# Patient Record
Sex: FEMALE | Race: WHITE | NOT HISPANIC OR LATINO | Employment: OTHER | URBAN - NONMETROPOLITAN AREA
[De-identification: names, ages, dates, MRNs, and addresses within clinical notes are randomized per-mention and may not be internally consistent; named-entity substitution may affect disease eponyms.]

---

## 2017-08-18 ENCOUNTER — OFFICE VISIT (OUTPATIENT)
Dept: CARDIOLOGY | Facility: PHYSICIAN GROUP | Age: 67
End: 2017-08-18
Payer: MEDICARE

## 2017-08-18 VITALS
HEART RATE: 70 BPM | OXYGEN SATURATION: 94 % | SYSTOLIC BLOOD PRESSURE: 110 MMHG | HEIGHT: 61 IN | DIASTOLIC BLOOD PRESSURE: 70 MMHG | BODY MASS INDEX: 32.85 KG/M2 | WEIGHT: 174 LBS

## 2017-08-18 DIAGNOSIS — I34.1 MVP (MITRAL VALVE PROLAPSE): ICD-10-CM

## 2017-08-18 DIAGNOSIS — I35.1 NONRHEUMATIC AORTIC VALVE INSUFFICIENCY: ICD-10-CM

## 2017-08-18 PROCEDURE — 99214 OFFICE O/P EST MOD 30 MIN: CPT | Performed by: INTERNAL MEDICINE

## 2017-08-18 ASSESSMENT — ENCOUNTER SYMPTOMS
HEARTBURN: 0
NERVOUS/ANXIOUS: 0
INSOMNIA: 0
COUGH: 0
MYALGIAS: 0
NAUSEA: 0
WEIGHT LOSS: 0
DIZZINESS: 0
PALPITATIONS: 0
HEADACHES: 0
NECK PAIN: 0
SHORTNESS OF BREATH: 0
BLOOD IN STOOL: 0
LOSS OF CONSCIOUSNESS: 0

## 2017-08-18 NOTE — PROGRESS NOTES
Subjective:   Shayy Flanagan is a 66 y.o. female who presents today in follow-up in regards to her mitral and aortic valve disease in the setting of palpitations    No more hospitalizations, she had a life-threatening bleed in 2015 resulting in surgery. She is tired a lot and wonders if this is due to malnutrition or lack of absorption     No chest pain or breathlessness   Past Medical History   Diagnosis Date   • Hypertension    • Palpitations    • Anxiety    • Insomnia    • Claustrophobia    • Mitral valve prolapse August 2014     Echocardiogram with normal LV size, mild concentric LVH, LVEF 60-65%. Mild-moderate AR, mild MR, no MVP seen. Trace TR. RVSP 37mmHg.   • Heart burn    • Indigestion    • Optic nerve drusen      Past Surgical History   Procedure Laterality Date   • Knee replacement, total  November 2013     Left knee   • Cholecystectomy  1995   • Tonsillectomy  1967   • Gastroscopy with biopsy  10/27/2014     Performed by Eric Kemp M.D. at SURGERY [facility]   • Gastric bypass laparoscopic revision  10/28/2014     Performed by John H Ganser, M.D. at [facility]     Family History   Problem Relation Age of Onset   • Stroke Brother      History   Smoking status   • Never Smoker    Smokeless tobacco   • Never Used     Allergies   Allergen Reactions   • Iodine    • Levaquin      Outpatient Encounter Prescriptions as of 8/18/2017   Medication Sig Dispense Refill   • levothyroxine (SYNTHROID) 75 MCG Tab Take 75 mcg by mouth Every morning on an empty stomach.     • gabapentin (NEURONTIN) 300 MG Cap Take 300 mg by mouth 3 times a day.     • sumatriptan (IMITREX) 100 MG tablet Take 100 mg by mouth Once PRN for Migraine.     • metoprolol SR (TOPROL XL) 25 MG TABLET SR 24 HR Take 1 Tab by mouth every day. 30 Tab 11   • hydrocodone/acetaminophen (NORCO)  MG TABS Take 1-2 Tabs by mouth every 6 hours as needed. 20 Tab 0   • lisinopril (PRINIVIL) 20 MG TABS Take 20 mg by mouth  "every day.       • latanoprost (XALATAN) 0.005 % SOLN Place 1 Drop in both eyes every evening.       • Multiple Vitamins-Minerals (MULTIVITAMIN PO) Take  by mouth every day.       • fluticasone (FLONASE) 50 MCG/ACT nasal spray Spray 2 Sprays in nose every day. 1 Bottle 1   • albuterol 108 (90 BASE) MCG/ACT Aero Soln inhalation aerosol Inhale 2 Puffs by mouth every 6 hours as needed for Shortness of Breath. 8.5 g 1     No facility-administered encounter medications on file as of 8/18/2017.     Review of Systems   Constitutional: Positive for malaise/fatigue. Negative for weight loss.   Respiratory: Negative for cough and shortness of breath.    Cardiovascular: Negative for chest pain, palpitations and leg swelling.   Gastrointestinal: Negative for heartburn, nausea and blood in stool.   Genitourinary: Negative for dysuria.   Musculoskeletal: Negative for myalgias and neck pain.   Skin: Negative for rash.   Neurological: Negative for dizziness, loss of consciousness and headaches.   Psychiatric/Behavioral: The patient is not nervous/anxious and does not have insomnia.    All other systems reviewed and are negative.       Objective:   /70 mmHg  Pulse 70  Ht 1.549 m (5' 0.98\")  Wt 78.926 kg (174 lb)  BMI 32.89 kg/m2  SpO2 94%    Physical Exam   Constitutional: She is oriented to person, place, and time. She appears well-developed.   HENT:   Mouth/Throat: Mucous membranes are normal.   Eyes: Conjunctivae and EOM are normal.   Neck: Normal range of motion. Neck supple. No JVD present. No thyroid mass and no thyromegaly present.   Cardiovascular: Normal rate, regular rhythm and intact distal pulses.    Murmur (26 blowing holosystolic at the apex) heard.  Normal Valsalva   Pulmonary/Chest: Effort normal and breath sounds normal. No respiratory distress. She exhibits no tenderness.   Abdominal: Bowel sounds are normal. She exhibits no distension.   Musculoskeletal: Normal range of motion. She exhibits no edema. "   Neurological: She is alert and oriented to person, place, and time. She has normal strength. She displays no tremor. Coordination normal.   Skin: Skin is warm and dry. No rash noted.   Psychiatric: She has a normal mood and affect. Her behavior is normal.   Vitals reviewed.      Assessment:     1. MVP (mitral valve prolapse)  Echocardiogram Comp w/o Cont   2. Nonrheumatic aortic valve insufficiency         Medical Decision Making:  Today's Assessment / Status / Plan:     Valvular heart disease. We reviewed her echo from 2016, she has mild to moderate aortic and mitral regurgitation. She had no significant mitral valve prolapse. Repeat this year     fatigue, as above. I also suggested she follow-up her thyroid-stimulating hormone as well as a B12 and complete blood count with her primary. We also talked about holding the metoprolol or taking it every other day if she is still tired     Palpitations, resolved on low-dose metoprolol  Hypertension, much better control and current medications, in fact it may be too low. We discussed this     RTC based on above   \We will repeat her echocardiogram

## 2017-08-18 NOTE — Clinical Note
[facility] [address]  Phone: [phone]  Fax: [phone]              Shayy Flanagan  1950    Encounter Date: 8/18/2017    Dang Ibarra M.D.          PROGRESS NOTE:  Subjective:   Shayy Flanagan is a 66 y.o. female who presents today in follow-up in regards to her mitral and aortic valve disease in the setting of palpitations    No more hospitalizations, she had a life-threatening bleed in 2015 resulting in surgery. She is tired a lot and wonders if this is due to malnutrition or lack of absorption     No chest pain or breathlessness   Past Medical History   Diagnosis Date   • Hypertension    • Palpitations    • Anxiety    • Insomnia    • Claustrophobia    • Mitral valve prolapse August 2014     Echocardiogram with normal LV size, mild concentric LVH, LVEF 60-65%. Mild-moderate AR, mild MR, no MVP seen. Trace TR. RVSP 37mmHg.   • Heart burn    • Indigestion    • Optic nerve drusen      Past Surgical History   Procedure Laterality Date   • Knee replacement, total  November 2013     Left knee   • Cholecystectomy  1995   • Tonsillectomy  1967   • Gastroscopy with biopsy  10/27/2014     Performed by Eric Kemp M.D. at SURGERY [facility]   • Gastric bypass laparoscopic revision  10/28/2014     Performed by John H Ganser, M.D. at [facility]     Family History   Problem Relation Age of Onset   • Stroke Brother      History   Smoking status   • Never Smoker    Smokeless tobacco   • Never Used     Allergies   Allergen Reactions   • Iodine    • Levaquin      Outpatient Encounter Prescriptions as of 8/18/2017   Medication Sig Dispense Refill   • levothyroxine (SYNTHROID) 75 MCG Tab Take 75 mcg by mouth Every morning on an empty stomach.     • gabapentin (NEURONTIN) 300 MG Cap Take 300 mg by mouth 3 times a day.     • sumatriptan (IMITREX) 100 MG tablet Take 100 mg by mouth Once PRN for  "Migraine.     • metoprolol SR (TOPROL XL) 25 MG TABLET SR 24 HR Take 1 Tab by mouth every day. 30 Tab 11   • hydrocodone/acetaminophen (NORCO)  MG TABS Take 1-2 Tabs by mouth every 6 hours as needed. 20 Tab 0   • lisinopril (PRINIVIL) 20 MG TABS Take 20 mg by mouth every day.       • latanoprost (XALATAN) 0.005 % SOLN Place 1 Drop in both eyes every evening.       • Multiple Vitamins-Minerals (MULTIVITAMIN PO) Take  by mouth every day.       • fluticasone (FLONASE) 50 MCG/ACT nasal spray Spray 2 Sprays in nose every day. 1 Bottle 1   • albuterol 108 (90 BASE) MCG/ACT Aero Soln inhalation aerosol Inhale 2 Puffs by mouth every 6 hours as needed for Shortness of Breath. 8.5 g 1     No facility-administered encounter medications on file as of 8/18/2017.     Review of Systems   Constitutional: Positive for malaise/fatigue. Negative for weight loss.   Respiratory: Negative for cough and shortness of breath.    Cardiovascular: Negative for chest pain, palpitations and leg swelling.   Gastrointestinal: Negative for heartburn, nausea and blood in stool.   Genitourinary: Negative for dysuria.   Musculoskeletal: Negative for myalgias and neck pain.   Skin: Negative for rash.   Neurological: Negative for dizziness, loss of consciousness and headaches.   Psychiatric/Behavioral: The patient is not nervous/anxious and does not have insomnia.    All other systems reviewed and are negative.       Objective:   /70 mmHg  Pulse 70  Ht 1.549 m (5' 0.98\")  Wt 78.926 kg (174 lb)  BMI 32.89 kg/m2  SpO2 94%    Physical Exam   Constitutional: She is oriented to person, place, and time. She appears well-developed.   HENT:   Mouth/Throat: Mucous membranes are normal.   Eyes: Conjunctivae and EOM are normal.   Neck: Normal range of motion. Neck supple. No JVD present. No thyroid mass and no thyromegaly present.   Cardiovascular: Normal rate, regular rhythm and intact distal pulses.    Murmur (26 blowing holosystolic at the " apex) heard.  Normal Valsalva   Pulmonary/Chest: Effort normal and breath sounds normal. No respiratory distress. She exhibits no tenderness.   Abdominal: Bowel sounds are normal. She exhibits no distension.   Musculoskeletal: Normal range of motion. She exhibits no edema.   Neurological: She is alert and oriented to person, place, and time. She has normal strength. She displays no tremor. Coordination normal.   Skin: Skin is warm and dry. No rash noted.   Psychiatric: She has a normal mood and affect. Her behavior is normal.   Vitals reviewed.      Assessment:     1. MVP (mitral valve prolapse)  Echocardiogram Comp w/o Cont   2. Nonrheumatic aortic valve insufficiency         Medical Decision Making:  Today's Assessment / Status / Plan:     Valvular heart disease. We reviewed her echo from 2016, she has mild to moderate aortic and mitral regurgitation. She had no significant mitral valve prolapse. Repeat this year     fatigue, as above. I also suggested she follow-up her thyroid-stimulating hormone as well as a B12 and complete blood count with her primary. We also talked about holding the metoprolol or taking it every other day if she is still tired     Palpitations, resolved on low-dose metoprolol  Hypertension, much better control and current medications, in fact it may be too low. We discussed this     RTC based on above   \We will repeat her echocardiogram        Rubia Damico, YELENARHieuN.  [address] [facility] [address]  VIA In Basket

## 2017-09-20 ENCOUNTER — NON-PROVIDER VISIT (OUTPATIENT)
Dept: CARDIOLOGY | Facility: CLINIC | Age: 67
End: 2017-09-20
Payer: MEDICARE

## 2017-09-20 DIAGNOSIS — I34.1 MVP (MITRAL VALVE PROLAPSE): ICD-10-CM

## 2017-09-20 DIAGNOSIS — K76.89 LIVER CYST: ICD-10-CM

## 2017-09-20 NOTE — PROGRESS NOTES
A liver cyst was seen on her echocardiogram. I ordered a limited ultrasound of her right upper quadrant to define that cyst.    SHRUTHI SMALL   all other ROS negative except as per HPI

## 2017-09-22 LAB
LV EJECT FRACT  99904: 65
LV EJECT FRACT MOD 2C 99903: 77.33
LV EJECT FRACT MOD 4C 99902: 68.89
LV EJECT FRACT MOD BP 99901: 74.06

## 2017-09-25 NOTE — PROGRESS NOTES
Called patient and advised her of liver cyst and order (cyst was also seen on echocardiogram that was done in March 2016). Patient verbalized understanding and agrees with plan.    Liver ultrasound order printed and mailed to patient, included Imaging Scheduling phone #[phone].    ELBERT RN

## 2017-09-26 ENCOUNTER — TELEPHONE (OUTPATIENT)
Dept: CARDIOLOGY | Facility: MEDICAL CENTER | Age: 67
End: 2017-09-26

## 2017-09-26 NOTE — LETTER
September 26, 2017        Shayy Flanagan   [address]  [facility] [address]        Dear Shayy SINHA:    Your cardiologist Dr. Ibarra has reviewed your echocardiogram from 9/20/2017. Her interpretation is as follows:    --Strong & normal heat funciton  --Very mild valve leak  --Liver cyst seen, liver ultrasound has been ordered.    You can schedule your liver ultrasound by calling Imaging Scheduling at [phone].    If you have any questions, please call the office at [phone].        Sincerely,            [facility]

## 2017-09-27 NOTE — TELEPHONE ENCOUNTER
Mailed patient a letter regarding her echocardiogram result and liver ultrasound order.    ELBERT JALLOH

## 2017-09-27 NOTE — TELEPHONE ENCOUNTER
----- Message from Dang Ibarra M.D. sent at 9/26/2017  3:33 PM PDT -----  Strong & normal heat funciton  Very mild valve leak

## 2017-10-02 ENCOUNTER — TELEPHONE (OUTPATIENT)
Dept: CARDIOLOGY | Facility: MEDICAL CENTER | Age: 67
End: 2017-10-02

## 2017-10-03 NOTE — TELEPHONE ENCOUNTER
Liver ultrasound order faxed to [facility] at fax #[phone].    Called patient and advised her of the above.    ELBERT RN

## 2017-10-03 NOTE — TELEPHONE ENCOUNTER
----- Message from Ana Petty sent at 10/2/2017  4:39 PM PDT -----  Regarding: liver ultrasound   Contact: [phone]  LA/luz marina    Pt calling to discuss arranging liver ultrasound to be done in [place] at [place]. Please call pt for all details

## 2017-10-31 ENCOUNTER — TELEPHONE (OUTPATIENT)
Dept: CARDIOLOGY | Facility: MEDICAL CENTER | Age: 67
End: 2017-10-31

## 2017-10-31 NOTE — TELEPHONE ENCOUNTER
Called patient and advised her of Dr. Ibarra's ultrasound interpretation and instructions. Patient verbalized understanding and will contact her gastroenterologist Dr. Eric Kemp.    Report faxed to the office of Dr. Kemp at fax #[phone].    ELBERT JALLOH

## 2017-10-31 NOTE — TELEPHONE ENCOUNTER
----- Message from Geovanni Ibarra M.D. sent at 10/31/2017  7:47 AM PDT -----  Please refer to gastroenterology for evaluation of her hepatic cyst.    SHRUTHI SMALL

## 2017-11-09 DIAGNOSIS — I34.1 MVP (MITRAL VALVE PROLAPSE): ICD-10-CM

## 2017-11-10 RX ORDER — METOPROLOL SUCCINATE 25 MG/1
25 TABLET, EXTENDED RELEASE ORAL DAILY
Qty: 30 TAB | Refills: 11 | Status: SHIPPED | OUTPATIENT
Start: 2017-11-10 | End: 2018-10-02

## 2018-09-27 ENCOUNTER — TELEPHONE (OUTPATIENT)
Dept: CARDIOLOGY | Facility: MEDICAL CENTER | Age: 68
End: 2018-09-27

## 2018-09-27 NOTE — TELEPHONE ENCOUNTER
Ana Gordon R.N.   Phone Number: [phone] [phone]             LA/chester Guadalupe from E/R at [facility] calling for a doctor to doctor conversation with LA about mutual pt who is in E/R at this time.  Dr Guadalupe can be reached at:  x4      Called Dr Guadalupe back from ER at [facility], informed him that Dr Ibarra is actually out of the office now, per Dr Dupree pt is currently in ER for Afib RVR, he wants to discussed POC and recommendations, offered to Dr Guadalupe that our ADD-Dr Gomez could return his call, Dr Guadalupe agreed.

## 2018-10-02 ENCOUNTER — OFFICE VISIT (OUTPATIENT)
Dept: CARDIOLOGY | Facility: MEDICAL CENTER | Age: 68
End: 2018-10-02
Payer: MEDICARE

## 2018-10-02 VITALS
HEART RATE: 106 BPM | DIASTOLIC BLOOD PRESSURE: 76 MMHG | WEIGHT: 174 LBS | SYSTOLIC BLOOD PRESSURE: 134 MMHG | HEIGHT: 62 IN | OXYGEN SATURATION: 94 % | BODY MASS INDEX: 32.02 KG/M2

## 2018-10-02 DIAGNOSIS — Z79.899 HIGH RISK MEDICATION USE: ICD-10-CM

## 2018-10-02 DIAGNOSIS — I48.0 PAROXYSMAL ATRIAL FIBRILLATION (HCC): ICD-10-CM

## 2018-10-02 DIAGNOSIS — I10 ESSENTIAL HYPERTENSION: ICD-10-CM

## 2018-10-02 DIAGNOSIS — I34.1 MVP (MITRAL VALVE PROLAPSE): ICD-10-CM

## 2018-10-02 DIAGNOSIS — I35.1 NONRHEUMATIC AORTIC VALVE INSUFFICIENCY: ICD-10-CM

## 2018-10-02 LAB — EKG IMPRESSION: NORMAL

## 2018-10-02 PROCEDURE — 93000 ELECTROCARDIOGRAM COMPLETE: CPT | Performed by: INTERNAL MEDICINE

## 2018-10-02 PROCEDURE — 99214 OFFICE O/P EST MOD 30 MIN: CPT | Mod: 25 | Performed by: INTERNAL MEDICINE

## 2018-10-02 RX ORDER — DILTIAZEM HYDROCHLORIDE 240 MG/1
240 CAPSULE, COATED, EXTENDED RELEASE ORAL DAILY
Qty: 30 CAP | Refills: 11 | Status: SHIPPED | OUTPATIENT
Start: 2018-10-02 | End: 2020-06-16

## 2018-10-02 RX ORDER — DILTIAZEM HYDROCHLORIDE 120 MG/1
120 CAPSULE, COATED, EXTENDED RELEASE ORAL DAILY
COMMUNITY
End: 2018-10-02

## 2018-10-02 ASSESSMENT — ENCOUNTER SYMPTOMS
WHEEZING: 0
SORE THROAT: 0
BRUISES/BLEEDS EASILY: 0
PALPITATIONS: 0
EYES NEGATIVE: 1
CLAUDICATION: 0
STRIDOR: 0
SPUTUM PRODUCTION: 0
HEMOPTYSIS: 0
COUGH: 0
LOSS OF CONSCIOUSNESS: 0
PND: 0
SHORTNESS OF BREATH: 0
CARDIOVASCULAR NEGATIVE: 1
FEVER: 0
RESPIRATORY NEGATIVE: 1
MUSCULOSKELETAL NEGATIVE: 1
DIZZINESS: 0
CHILLS: 0
ORTHOPNEA: 0
GASTROINTESTINAL NEGATIVE: 1
NEUROLOGICAL NEGATIVE: 1
CONSTITUTIONAL NEGATIVE: 1
WEAKNESS: 0

## 2018-10-02 NOTE — PROGRESS NOTES
Chief Complaint   Patient presents with   • Hypertension     F/V: 1 YR       Subjective:   Shayy Flanagan is a 68 y.o. female who presents today as a follow-up for her atrial fibrillation.  She was recently in the hospital in [facility] and diagnosed with atrial fibrillation.  She was [place] take metoprolol 25/day and this was increased to 50 with addition of diltiazem.  Couple days after discharge she started feeling symptomatic and weak with some lightheadedness.  EMS was called and she was noted to be in sinus rhythm with a heart rate in the 40s.  Because of this her primary care physician asked her to see a cardiologist.  She presents today with a heart rate in the 60s.  On exam she went into atrial fibrillation.  An EKG showed her to be in A. fib with a rate of approximately 99.  She is not taking any blood thinners she was told to defer this to the cardiologist.  Aside from palpitations she has no symptoms of her atrial fibrillation.  She does get slightly short of breath with exertion.    Past Medical History:   Diagnosis Date   • Anxiety    • Claustrophobia    • Heart burn    • Hypertension    • Indigestion    • Insomnia    • Mitral valve prolapse August 2014    Echocardiogram with normal LV size, mild concentric LVH, LVEF 60-65%. Mild-moderate AR, mild MR, no MVP seen. Trace TR. RVSP 37mmHg.   • Optic nerve drusen    • Palpitations      Past Surgical History:   Procedure Laterality Date   • GASTRIC BYPASS LAPAROSCOPIC REVISION  10/28/2014    Performed by John H Ganser, M.D. at [facility]   • GASTROSCOPY WITH BIOPSY  10/27/2014    Performed by Eric Kemp M.D. at [facility]   • KNEE REPLACEMENT, TOTAL  November 2013    Left knee   • CHOLECYSTECTOMY  1995   • TONSILLECTOMY  1967     Family History   Problem Relation Age of Onset   • Stroke Brother      Social History     Social History   • Marital status:      Spouse name: N/A   • Number of  children: N/A   • Years of education: N/A     Occupational History   • Not on file.     Social History Main Topics   • Smoking status: Never Smoker   • Smokeless tobacco: Never Used   • Alcohol use No   • Drug use: No   • Sexual activity: Not on file     Other Topics Concern   • Not on file     Social History Narrative   • No narrative on file     Allergies   Allergen Reactions   • Ciprofloxacin Hcl Anaphylaxis   • Iodine    • Levaquin      Outpatient Encounter Prescriptions as of 10/2/2018   Medication Sig Dispense Refill   • DILTIAZem CD (CARDIZEM CD) 240 MG CAPSULE SR 24 HR Take 1 Cap by mouth every day. 30 Cap 11   • apixaban (ELIQUIS) 5mg Tab Take 1 Tab by mouth 2 Times a Day. 60 Tab 11   • levothyroxine (SYNTHROID) 75 MCG Tab Take 75 mcg by mouth Every morning on an empty stomach.     • gabapentin (NEURONTIN) 300 MG Cap Take 300 mg by mouth 3 times a day.     • lisinopril (PRINIVIL) 20 MG TABS Take 20 mg by mouth every day.       • latanoprost (XALATAN) 0.005 % SOLN Place 1 Drop in both eyes every evening.       • Multiple Vitamins-Minerals (MULTIVITAMIN PO) Take  by mouth every day.       • [DISCONTINUED] DILTIAZem CD (DILTIAZEM CD) 120 MG CAPSULE SR 24 HR Take 120 mg by mouth every day.     • [DISCONTINUED] metoprolol SR (TOPROL XL) 25 MG TABLET SR 24 HR Take 1 Tab by mouth every day. (Patient taking differently: Take 100 mg by mouth 2 Times a Day.) 30 Tab 11   • fluticasone (FLONASE) 50 MCG/ACT nasal spray Spray 2 Sprays in nose every day. 1 Bottle 1   • albuterol 108 (90 BASE) MCG/ACT Aero Soln inhalation aerosol Inhale 2 Puffs by mouth every 6 hours as needed for Shortness of Breath. 8.5 g 1   • sumatriptan (IMITREX) 100 MG tablet Take 100 mg by mouth Once PRN for Migraine.     • hydrocodone/acetaminophen (NORCO)  MG TABS Take 1-2 Tabs by mouth every 6 hours as needed. 20 Tab 0     No facility-administered encounter medications on file as of 10/2/2018.      Review of Systems   Constitutional:  "Negative.  Negative for chills, fever and malaise/fatigue.   HENT: Negative.  Negative for sore throat.    Eyes: Negative.    Respiratory: Negative.  Negative for cough, hemoptysis, sputum production, shortness of breath, wheezing and stridor.    Cardiovascular: Negative.  Negative for chest pain, palpitations, orthopnea, claudication, leg swelling and PND.   Gastrointestinal: Negative.    Genitourinary: Negative.    Musculoskeletal: Negative.    Skin: Negative.    Neurological: Negative.  Negative for dizziness, loss of consciousness and weakness.   Endo/Heme/Allergies: Negative.  Does not bruise/bleed easily.   All other systems reviewed and are negative.       Objective:   /76 (BP Location: Left arm, Patient Position: Sitting, BP Cuff Size: Adult)   Pulse (!) 106   Ht 1.575 m (5' 2\")   Wt 78.9 kg (174 lb)   SpO2 94%   BMI 31.83 kg/m²     Physical Exam   Constitutional: She appears well-developed and well-nourished. No distress.   HENT:   Head: Normocephalic and atraumatic.   Right Ear: External ear normal.   Left Ear: External ear normal.   Nose: Nose normal.   Mouth/Throat: No oropharyngeal exudate.   Eyes: Pupils are equal, round, and reactive to light. Conjunctivae and EOM are normal. Right eye exhibits no discharge. Left eye exhibits no discharge. No scleral icterus.   Neck: Neck supple. No JVD present.   Cardiovascular: Normal rate, regular rhythm and intact distal pulses.  Exam reveals no gallop and no friction rub.    No murmur heard.  Pulmonary/Chest: Effort normal. No stridor. No respiratory distress. She has no wheezes. She has no rales. She exhibits no tenderness.   Abdominal: Soft. She exhibits no distension. There is no guarding.   Musculoskeletal: Normal range of motion. She exhibits no edema, tenderness or deformity.   Neurological: She is alert. She has normal reflexes. She displays normal reflexes. No cranial nerve deficit. She exhibits normal muscle tone. Coordination normal.   Skin: " Skin is warm and dry. No rash noted. She is not diaphoretic. No erythema. No pallor.   Psychiatric: She has a normal mood and affect. Her behavior is normal. Judgment and thought content normal.   Nursing note and vitals reviewed.      Assessment:     1. Paroxysmal atrial fibrillation (HCC)  EKG    EC-ECHOCARDIOGRAM COMPLETE W/O CONT    DILTIAZem CD (CARDIZEM CD) 240 MG CAPSULE SR 24 HR    apixaban (ELIQUIS) 5mg Tab   2. MVP (mitral valve prolapse)  EC-ECHOCARDIOGRAM COMPLETE W/O CONT   3. Essential hypertension     4. Nonrheumatic aortic valve insufficiency     5. High risk medication use         Medical Decision Making:  Today's Assessment / Status / Plan:     68-year-old female with paroxysmal atrial fibrillation with I think too much medication.  I have asked her to stop the metoprolol as I think it is also causing side effects of fatigue.  Additionally I will increase her diltiazem to 240/day.  I also discussed the rate versus rhythm control strategy for her.  She will continue with a rate control strategy.  In terms of her risk of stroke versus bleeding it does favor being on an oral anticoagulant.  She has mitral valve prolapse causing mitral regurgitation which is not a contraindication to oral anticoagulation.  Therefore we discussed direct oral anticoagulants.  We will start on 5 mg of Eliquis twice per day day.  We will see her back in 3 months.    Thank for you allowing me to take part in your patient's care, please call should you have any questions or would like to discuss this patient.

## 2018-10-02 NOTE — LETTER
[facility]   [address]  [place] [address]  Phone: [phone]  Fax: [phone]              Shayy Flanagan  1950    Encounter Date: 10/2/2018    Alf Cohen M.D.          PROGRESS NOTE:  Chief Complaint   Patient presents with   • Hypertension     F/V: 1 YR       Subjective:   Shayy Flanagan is a 68 y.o. female who presents today as a follow-up for her atrial fibrillation.  She was recently in the hospital in [facility] and diagnosed with atrial fibrillation.  She was [place] take metoprolol 25/day and this was increased to 50 with addition of diltiazem.  Couple days after discharge she started feeling symptomatic and weak with some lightheadedness.  EMS was called and she was noted to be in sinus rhythm with a heart rate in the 40s.  Because of this her primary care physician asked her to see a cardiologist.  She presents today with a heart rate in the 60s.  On exam she went into atrial fibrillation.  An EKG showed her to be in A. fib with a rate of approximately 99.  She is not taking any blood thinners she was told to defer this to the cardiologist.  Aside from palpitations she has no symptoms of her atrial fibrillation.  She does get slightly short of breath with exertion.    Past Medical History:   Diagnosis Date   • Anxiety    • Claustrophobia    • Heart burn    • Hypertension    • Indigestion    • Insomnia    • Mitral valve prolapse August 2014    Echocardiogram with normal LV size, mild concentric LVH, LVEF 60-65%. Mild-moderate AR, mild MR, no MVP seen. Trace TR. RVSP 37mmHg.   • Optic nerve drusen    • Palpitations      Past Surgical History:   Procedure Laterality Date   • GASTRIC BYPASS LAPAROSCOPIC REVISION  10/28/2014    Performed by John H Ganser, M.D. at [facility]   • GASTROSCOPY WITH BIOPSY  10/27/2014    Performed by Eric Kemp M.D. at [facility]   • KNEE  REPLACEMENT, TOTAL  November 2013    Left knee   • CHOLECYSTECTOMY  1995   • TONSILLECTOMY  1967     Family History   Problem Relation Age of Onset   • Stroke Brother      Social History     Social History   • Marital status:      Spouse name: N/A   • Number of children: N/A   • Years of education: N/A     Occupational History   • Not on file.     Social History Main Topics   • Smoking status: Never Smoker   • Smokeless tobacco: Never Used   • Alcohol use No   • Drug use: No   • Sexual activity: Not on file     Other Topics Concern   • Not on file     Social History Narrative   • No narrative on file     Allergies   Allergen Reactions   • Ciprofloxacin Hcl Anaphylaxis   • Iodine    • Levaquin      Outpatient Encounter Prescriptions as of 10/2/2018   Medication Sig Dispense Refill   • DILTIAZem CD (CARDIZEM CD) 240 MG CAPSULE SR 24 HR Take 1 Cap by mouth every day. 30 Cap 11   • apixaban (ELIQUIS) 5mg Tab Take 1 Tab by mouth 2 Times a Day. 60 Tab 11   • levothyroxine (SYNTHROID) 75 MCG Tab Take 75 mcg by mouth Every morning on an empty stomach.     • gabapentin (NEURONTIN) 300 MG Cap Take 300 mg by mouth 3 times a day.     • lisinopril (PRINIVIL) 20 MG TABS Take 20 mg by mouth every day.       • latanoprost (XALATAN) 0.005 % SOLN Place 1 Drop in both eyes every evening.       • Multiple Vitamins-Minerals (MULTIVITAMIN PO) Take  by mouth every day.       • [DISCONTINUED] DILTIAZem CD (DILTIAZEM CD) 120 MG CAPSULE SR 24 HR Take 120 mg by mouth every day.     • [DISCONTINUED] metoprolol SR (TOPROL XL) 25 MG TABLET SR 24 HR Take 1 Tab by mouth every day. (Patient taking differently: Take 100 mg by mouth 2 Times a Day.) 30 Tab 11   • fluticasone (FLONASE) 50 MCG/ACT nasal spray Spray 2 Sprays in nose every day. 1 Bottle 1   • albuterol 108 (90 BASE) MCG/ACT Aero Soln inhalation aerosol Inhale 2 Puffs by mouth every 6 hours as needed for Shortness of Breath. 8.5 g 1   • sumatriptan (IMITREX) 100 MG tablet Take 100  "mg by mouth Once PRN for Migraine.     • hydrocodone/acetaminophen (NORCO)  MG TABS Take 1-2 Tabs by mouth every 6 hours as needed. 20 Tab 0     No facility-administered encounter medications on file as of 10/2/2018.      Review of Systems   Constitutional: Negative.  Negative for chills, fever and malaise/fatigue.   HENT: Negative.  Negative for sore throat.    Eyes: Negative.    Respiratory: Negative.  Negative for cough, hemoptysis, sputum production, shortness of breath, wheezing and stridor.    Cardiovascular: Negative.  Negative for chest pain, palpitations, orthopnea, claudication, leg swelling and PND.   Gastrointestinal: Negative.    Genitourinary: Negative.    Musculoskeletal: Negative.    Skin: Negative.    Neurological: Negative.  Negative for dizziness, loss of consciousness and weakness.   Endo/Heme/Allergies: Negative.  Does not bruise/bleed easily.   All other systems reviewed and are negative.       Objective:   /76 (BP Location: Left arm, Patient Position: Sitting, BP Cuff Size: Adult)   Pulse (!) 106   Ht 1.575 m (5' 2\")   Wt 78.9 kg (174 lb)   SpO2 94%   BMI 31.83 kg/m²      Physical Exam   Constitutional: She appears well-developed and well-nourished. No distress.   HENT:   Head: Normocephalic and atraumatic.   Right Ear: External ear normal.   Left Ear: External ear normal.   Nose: Nose normal.   Mouth/Throat: No oropharyngeal exudate.   Eyes: Pupils are equal, round, and reactive to light. Conjunctivae and EOM are normal. Right eye exhibits no discharge. Left eye exhibits no discharge. No scleral icterus.   Neck: Neck supple. No JVD present.   Cardiovascular: Normal rate, regular rhythm and intact distal pulses.  Exam reveals no gallop and no friction rub.    No murmur heard.  Pulmonary/Chest: Effort normal. No stridor. No respiratory distress. She has no wheezes. She has no rales. She exhibits no tenderness.   Abdominal: Soft. She exhibits no distension. There is no guarding. "   Musculoskeletal: Normal range of motion. She exhibits no edema, tenderness or deformity.   Neurological: She is alert. She has normal reflexes. She displays normal reflexes. No cranial nerve deficit. She exhibits normal muscle tone. Coordination normal.   Skin: Skin is warm and dry. No rash noted. She is not diaphoretic. No erythema. No pallor.   Psychiatric: She has a normal mood and affect. Her behavior is normal. Judgment and thought content normal.   Nursing note and vitals reviewed.      Assessment:     1. Paroxysmal atrial fibrillation (HCC)  EKG    EC-ECHOCARDIOGRAM COMPLETE W/O CONT    DILTIAZem CD (CARDIZEM CD) 240 MG CAPSULE SR 24 HR    apixaban (ELIQUIS) 5mg Tab   2. MVP (mitral valve prolapse)  EC-ECHOCARDIOGRAM COMPLETE W/O CONT   3. Essential hypertension     4. Nonrheumatic aortic valve insufficiency     5. High risk medication use         Medical Decision Making:  Today's Assessment / Status / Plan:     68-year-old female with paroxysmal atrial fibrillation with I think too much medication.  I have asked her to stop the metoprolol as I think it is also causing side effects of fatigue.  Additionally I will increase her diltiazem to 240/day.  I also discussed the rate versus rhythm control strategy for her.  She will continue with a rate control strategy.  In terms of her risk of stroke versus bleeding it does favor being on an oral anticoagulant.  She has mitral valve prolapse causing mitral regurgitation which is not a contraindication to oral anticoagulation.  Therefore we discussed direct oral anticoagulants.  We will start on 5 mg of Eliquis twice per day day.  We will see her back in 3 months.    Thank for you allowing me to take part in your patient's care, please call should you have any questions or would like to discuss this patient.      Rubia Damico A.P.R.N.  [address] [address]  VIA In Basket

## 2018-10-03 ENCOUNTER — TELEPHONE (OUTPATIENT)
Dept: CARDIOLOGY | Facility: MEDICAL CENTER | Age: 68
End: 2018-10-03

## 2018-10-03 DIAGNOSIS — I48.0 PAROXYSMAL ATRIAL FIBRILLATION (HCC): ICD-10-CM

## 2018-10-03 NOTE — TELEPHONE ENCOUNTER
Called pt regarding Eliquis expense. She states [facility] and she does not qualify for their assistance program. She would like to switch to a medication that would be cheaper for her.     To RO to advise.

## 2018-10-03 NOTE — TELEPHONE ENCOUNTER
Called pt with Dr. Cohen's recomendation for coumadin. Pt states she does not think she'd be able to get to town frequently enough for INR checks with coumadin. She would like us to send a Rx for eliquis samples while she checks into getting help paying for her eliquis. [facility]

## 2018-10-03 NOTE — TELEPHONE ENCOUNTER
----- Message from Mounika Tran sent at 10/3/2018  8:16 AM PDT -----  Regarding: patient can't afford Eliquis medication  [facility]      Patient can't afford Eliquis. She is asking for an alternative medication. She can be reached at [phone].

## 2018-10-30 ENCOUNTER — TELEPHONE (OUTPATIENT)
Dept: CARDIOLOGY | Facility: MEDICAL CENTER | Age: 68
End: 2018-10-30

## 2018-10-30 DIAGNOSIS — I48.0 PAROXYSMAL ATRIAL FIBRILLATION (HCC): ICD-10-CM

## 2018-10-30 NOTE — TELEPHONE ENCOUNTER
----- Message from Mounika Tran sent at 10/29/2018  4:22 PM PDT -----  Regarding: patient calling to discuss options for blood thinners  [facility]      Patient wants to know if she goes on warfarin if she can get her INR checked in [place], or home INR monitoring? SHe is asking what her options are for blood-thinners. She can be reached at [phone].

## 2018-10-30 NOTE — TELEPHONE ENCOUNTER
Spoke with pt regarding changing blood thinners. Offered to provide pt with samples of Eliquis to cover her until she comes to the office to see Dr. Ibarra. Pt agrees. Rx sent to [facility] and called to request they mail samples to the pt. They confirmed samples will be mailed.

## 2018-11-26 ENCOUNTER — OFFICE VISIT (OUTPATIENT)
Dept: CARDIOLOGY | Facility: PHYSICIAN GROUP | Age: 68
End: 2018-11-26
Payer: MEDICARE

## 2018-11-26 VITALS
HEART RATE: 84 BPM | BODY MASS INDEX: 31.65 KG/M2 | HEIGHT: 62 IN | OXYGEN SATURATION: 96 % | WEIGHT: 172 LBS | SYSTOLIC BLOOD PRESSURE: 132 MMHG | DIASTOLIC BLOOD PRESSURE: 78 MMHG

## 2018-11-26 DIAGNOSIS — I48.0 PAROXYSMAL ATRIAL FIBRILLATION (HCC): ICD-10-CM

## 2018-11-26 DIAGNOSIS — I10 ESSENTIAL HYPERTENSION: ICD-10-CM

## 2018-11-26 DIAGNOSIS — I34.1 MVP (MITRAL VALVE PROLAPSE): ICD-10-CM

## 2018-11-26 DIAGNOSIS — K26.9 DUODENAL ULCER: ICD-10-CM

## 2018-11-26 DIAGNOSIS — Z79.01 ANTICOAGULATED: ICD-10-CM

## 2018-11-26 DIAGNOSIS — Z79.899 HIGH RISK MEDICATION USE: ICD-10-CM

## 2018-11-26 DIAGNOSIS — I35.1 NONRHEUMATIC AORTIC VALVE INSUFFICIENCY: ICD-10-CM

## 2018-11-26 PROCEDURE — 99214 OFFICE O/P EST MOD 30 MIN: CPT | Performed by: INTERNAL MEDICINE

## 2018-11-26 RX ORDER — METOPROLOL TARTRATE 50 MG/1
50 TABLET, FILM COATED ORAL 2 TIMES DAILY PRN
Qty: 180 TAB | Refills: 3 | Status: SHIPPED | OUTPATIENT
Start: 2018-11-26 | End: 2020-06-16

## 2018-11-26 RX ORDER — METOPROLOL TARTRATE 50 MG/1
50 TABLET, FILM COATED ORAL 2 TIMES DAILY
COMMUNITY
End: 2018-11-26 | Stop reason: SDUPTHER

## 2018-11-26 ASSESSMENT — ENCOUNTER SYMPTOMS
BLOOD IN STOOL: 0
CLAUDICATION: 0
NERVOUS/ANXIOUS: 0
MYALGIAS: 1
BRUISES/BLEEDS EASILY: 0
BACK PAIN: 0
BLURRED VISION: 0
DOUBLE VISION: 0
ABDOMINAL PAIN: 0
VOMITING: 0
SHORTNESS OF BREATH: 0
COUGH: 0
INSOMNIA: 0
FEVER: 0
CHILLS: 0
WEIGHT LOSS: 0
DEPRESSION: 0
DIZZINESS: 0
PALPITATIONS: 1
WHEEZING: 0
LOSS OF CONSCIOUSNESS: 0
NAUSEA: 0
HEARTBURN: 0

## 2018-11-26 NOTE — LETTER
[facility] [address]  Phone: [phone]  Fax: [phone]              Shayy Flanagan  1950    Encounter Date: 11/26/2018    Dang Ibarra M.D.          PROGRESS NOTE:  Chief Complaint   Patient presents with   • Hypertension   • Atrial Fibrillation       Subjective:   Shayy Flanagan is a 68 y.o. female who presents today in regards to her persistent atrial fibrillation now on chronic anticoagulation in the setting of acute bleeding due to gastric/duodenal ulcer 2016, hypertension    She was seen by my partner last month.  Tolerating anticoagulation.  Dr. Kemp of  feels that she is safe to be on this.  No more bleeding after her curative surgery.  Somewhat tired, blames it on getting older.  No recent blood work denies bleeding    Does better on lower dose diltiazem and metoprolol    No setbacks doing well, meds are not that affordable but looking forward to getting Medicare next year    Past Medical History:   Diagnosis Date   • Anxiety    • Claustrophobia    • Heart burn    • Hypertension    • Indigestion    • Insomnia    • Mitral valve prolapse August 2014    Echocardiogram with normal LV size, mild concentric LVH, LVEF 60-65%. Mild-moderate AR, mild MR, no MVP seen. Trace TR. RVSP 37mmHg.   • Optic nerve drusen    • Palpitations      Past Surgical History:   Procedure Laterality Date   • GASTRIC BYPASS LAPAROSCOPIC REVISION  10/28/2014    Performed by John H Ganser, M.D. at [facility]   • GASTROSCOPY WITH BIOPSY  10/27/2014    Performed by Eric Kemp M.D. at [facility]   • KNEE REPLACEMENT, TOTAL  November 2013    Left knee   • CHOLECYSTECTOMY  1995   • TONSILLECTOMY  1967     Family History   Problem Relation Age of Onset   • Stroke Brother      Social History     Social History   • Marital status:      Spouse name: N/A   • Number of children: N/A   •  Years of education: N/A     Occupational History   • Not on file.     Social History Main Topics   • Smoking status: Never Smoker   • Smokeless tobacco: Never Used   • Alcohol use No   • Drug use: No   • Sexual activity: Not on file     Other Topics Concern   • Not on file     Social History Narrative   • No narrative on file     Allergies   Allergen Reactions   • Ciprofloxacin Hcl Anaphylaxis   • Iodine    • Levaquin      Outpatient Encounter Prescriptions as of 11/26/2018   Medication Sig Dispense Refill   • metoprolol (LOPRESSOR) 50 MG Tab Take 50 mg by mouth 2 times a day.     • apixaban (ELIQUIS) 5mg Tab Take 1 Tab by mouth 2 Times a Day. 60 Tab 0   • DILTIAZem CD (CARDIZEM CD) 240 MG CAPSULE SR 24 HR Take 1 Cap by mouth every day. 30 Cap 11   • levothyroxine (SYNTHROID) 75 MCG Tab Take 75 mcg by mouth Every morning on an empty stomach.     • gabapentin (NEURONTIN) 300 MG Cap Take 300 mg by mouth 3 times a day.     • lisinopril (PRINIVIL) 20 MG TABS Take 20 mg by mouth every day.       • latanoprost (XALATAN) 0.005 % SOLN Place 1 Drop in both eyes every evening.       • Multiple Vitamins-Minerals (MULTIVITAMIN PO) Take  by mouth every day.       • fluticasone (FLONASE) 50 MCG/ACT nasal spray Spray 2 Sprays in nose every day. 1 Bottle 1   • albuterol 108 (90 BASE) MCG/ACT Aero Soln inhalation aerosol Inhale 2 Puffs by mouth every 6 hours as needed for Shortness of Breath. 8.5 g 1   • sumatriptan (IMITREX) 100 MG tablet Take 100 mg by mouth Once PRN for Migraine.     • hydrocodone/acetaminophen (NORCO)  MG TABS Take 1-2 Tabs by mouth every 6 hours as needed. 20 Tab 0     No facility-administered encounter medications on file as of 11/26/2018.      Review of Systems   Constitutional: Positive for malaise/fatigue. Negative for chills, fever and weight loss.   HENT: Negative for hearing loss and tinnitus.    Eyes: Negative for blurred vision and double vision.   Respiratory: Negative for cough, shortness of  "breath and wheezing.    Cardiovascular: Positive for palpitations. Negative for chest pain, claudication and leg swelling.   Gastrointestinal: Negative for abdominal pain, blood in stool, heartburn, melena, nausea and vomiting.   Genitourinary: Negative for dysuria and urgency.   Musculoskeletal: Positive for myalgias. Negative for back pain and joint pain.   Neurological: Negative for dizziness and loss of consciousness.   Endo/Heme/Allergies: Does not bruise/bleed easily.   Psychiatric/Behavioral: Negative for depression. The patient is not nervous/anxious and does not have insomnia.    All other systems reviewed and are negative.       Objective:   /78 (BP Location: Left arm, Patient Position: Sitting, BP Cuff Size: Adult)   Pulse 84   Ht 1.575 m (5' 2\")   Wt 78 kg (172 lb)   SpO2 96%   BMI 31.46 kg/m²      Physical Exam   Constitutional: She is oriented to person, place, and time. She appears well-developed and well-nourished.   HENT:   Head: Atraumatic.   Eyes: Pupils are equal, round, and reactive to light. EOM are normal. Left eye exhibits no discharge.   Neck: No JVD present. No thyromegaly present.   Cardiovascular: Normal rate, regular rhythm and intact distal pulses.  Exam reveals no gallop.    No murmur heard.  Pulmonary/Chest: Breath sounds normal. No respiratory distress. She exhibits no tenderness.   Abdominal: Bowel sounds are normal. She exhibits no distension.   Musculoskeletal: She exhibits no edema or tenderness.   Lymphadenopathy:     She has no cervical adenopathy.   Neurological: She is alert and oriented to person, place, and time. She exhibits normal muscle tone. Coordination normal.   Skin: Skin is warm and dry. No rash noted.   Psychiatric: She has a normal mood and affect. Her behavior is normal.       Assessment:     1. MVP (mitral valve prolapse)     2. Paroxysmal atrial fibrillation (HCC)     3. Essential hypertension     4. High risk medication use     5. Nonrheumatic " aortic valve insufficiency     6. Duodenal ulcer     7. Anticoagulated         Medical Decision Making:  Today's Assessment / Status / Plan:       Anticoagulation  Reviewed her last blood work, I did order a CBC, discussed TSH if warranted if she is still tired.  Talked about daily activity and she will follow-up with her PCP    Atrial fibrillation  Echo was not affordable for her.  We talked about doing an EKG she does not have time to stay today    Talked about prognosis and anticoagulation.  CBC ordered as above.  We looked at the images of her echocardiogram    Hypertension  Good control on current medical regimen.  Medications ordered, again requested blood work and talked about follow-up    Echo if affordable in a year  RTC in the spring sooner with concerns.  I did order her anticoagulation through our sample clinic.  She will let us know how much it cost next year.  Metoprolol renewed as well      Rubia Damico A.P.R.N.  [address] [facility] [address]  VIA In Basket

## 2018-11-26 NOTE — PROGRESS NOTES
Chief Complaint   Patient presents with   • Hypertension   • Atrial Fibrillation       Subjective:   Shayy Flanagan is a 68 y.o. female who presents today in regards to her persistent atrial fibrillation now on chronic anticoagulation in the setting of acute bleeding due to gastric/duodenal ulcer 2016, hypertension    She was seen by my partner last month.  Tolerating anticoagulation.  Dr. Kemp of  feels that she is safe to be on this.  No more bleeding after her curative surgery.  Somewhat tired, blames it on getting older.  No recent blood work denies bleeding    Does better on lower dose diltiazem and metoprolol -palpitations are mild and very brief.    No setbacks doing well, meds are not that affordable but looking forward to getting Medicare next year    Past Medical History:   Diagnosis Date   • Anxiety    • Claustrophobia    • Heart burn    • Hypertension    • Indigestion    • Insomnia    • Mitral valve prolapse August 2014    Echocardiogram with normal LV size, mild concentric LVH, LVEF 60-65%. Mild-moderate AR, mild MR, no MVP seen. Trace TR. RVSP 37mmHg.   • Optic nerve drusen    • Palpitations      Past Surgical History:   Procedure Laterality Date   • GASTRIC BYPASS LAPAROSCOPIC REVISION  10/28/2014    Performed by John H Ganser, M.D. at [facility]   • GASTROSCOPY WITH BIOPSY  10/27/2014    Performed by Eric Kemp M.D. at [facility]   • KNEE REPLACEMENT, TOTAL  November 2013    Left knee   • CHOLECYSTECTOMY  1995   • TONSILLECTOMY  1967     Family History   Problem Relation Age of Onset   • Stroke Brother      Social History     Social History   • Marital status:      Spouse name: N/A   • Number of children: N/A   • Years of education: N/A     Occupational History   • Not on file.     Social History Main Topics   • Smoking status: Never Smoker   • Smokeless tobacco: Never Used   • Alcohol use No   • Drug use: No   • Sexual activity: Not on file      Other Topics Concern   • Not on file     Social History Narrative   • No narrative on file     Allergies   Allergen Reactions   • Ciprofloxacin Hcl Anaphylaxis   • Iodine    • Levaquin      Outpatient Encounter Prescriptions as of 11/26/2018   Medication Sig Dispense Refill   • metoprolol (LOPRESSOR) 50 MG Tab Take 50 mg by mouth 2 times a day.     • apixaban (ELIQUIS) 5mg Tab Take 1 Tab by mouth 2 Times a Day. 60 Tab 0   • DILTIAZem CD (CARDIZEM CD) 240 MG CAPSULE SR 24 HR Take 1 Cap by mouth every day. 30 Cap 11   • levothyroxine (SYNTHROID) 75 MCG Tab Take 75 mcg by mouth Every morning on an empty stomach.     • gabapentin (NEURONTIN) 300 MG Cap Take 300 mg by mouth 3 times a day.     • lisinopril (PRINIVIL) 20 MG TABS Take 20 mg by mouth every day.       • latanoprost (XALATAN) 0.005 % SOLN Place 1 Drop in both eyes every evening.       • Multiple Vitamins-Minerals (MULTIVITAMIN PO) Take  by mouth every day.       • fluticasone (FLONASE) 50 MCG/ACT nasal spray Spray 2 Sprays in nose every day. 1 Bottle 1   • albuterol 108 (90 BASE) MCG/ACT Aero Soln inhalation aerosol Inhale 2 Puffs by mouth every 6 hours as needed for Shortness of Breath. 8.5 g 1   • sumatriptan (IMITREX) 100 MG tablet Take 100 mg by mouth Once PRN for Migraine.     • hydrocodone/acetaminophen (NORCO)  MG TABS Take 1-2 Tabs by mouth every 6 hours as needed. 20 Tab 0     No facility-administered encounter medications on file as of 11/26/2018.      Review of Systems   Constitutional: Positive for malaise/fatigue. Negative for chills, fever and weight loss.   HENT: Negative for hearing loss and tinnitus.    Eyes: Negative for blurred vision and double vision.   Respiratory: Negative for cough, shortness of breath and wheezing.    Cardiovascular: Positive for palpitations. Negative for chest pain, claudication and leg swelling.   Gastrointestinal: Negative for abdominal pain, blood in stool, heartburn, melena, nausea and vomiting.  "  Genitourinary: Negative for dysuria and urgency.   Musculoskeletal: Positive for myalgias. Negative for back pain and joint pain.   Neurological: Negative for dizziness and loss of consciousness.   Endo/Heme/Allergies: Does not bruise/bleed easily.   Psychiatric/Behavioral: Negative for depression. The patient is not nervous/anxious and does not have insomnia.    All other systems reviewed and are negative.       Objective:   /78 (BP Location: Left arm, Patient Position: Sitting, BP Cuff Size: Adult)   Pulse 84   Ht 1.575 m (5' 2\")   Wt 78 kg (172 lb)   SpO2 96%   BMI 31.46 kg/m²     Physical Exam   Constitutional: She is oriented to person, place, and time. She appears well-developed and well-nourished.   HENT:   Head: Atraumatic.   Eyes: Pupils are equal, round, and reactive to light. EOM are normal. Left eye exhibits no discharge.   Neck: No JVD present. No thyromegaly present.   Cardiovascular: Normal rate, regular rhythm and intact distal pulses.  Exam reveals no gallop.    No murmur heard.  Pulmonary/Chest: Breath sounds normal. No respiratory distress. She exhibits no tenderness.   Abdominal: Bowel sounds are normal. She exhibits no distension.   Musculoskeletal: She exhibits no edema or tenderness.   Lymphadenopathy:     She has no cervical adenopathy.   Neurological: She is alert and oriented to person, place, and time. She exhibits normal muscle tone. Coordination normal.   Skin: Skin is warm and dry. No rash noted.   Psychiatric: She has a normal mood and affect. Her behavior is normal.       Assessment:     1. MVP (mitral valve prolapse)     2. Paroxysmal atrial fibrillation (HCC)     3. Essential hypertension     4. High risk medication use     5. Nonrheumatic aortic valve insufficiency     6. Duodenal ulcer     7. Anticoagulated         Medical Decision Making:  Today's Assessment / Status / Plan:       Anticoagulation  Reviewed her last blood work, I did order a CBC, discussed TSH if " warranted if she is still tired.  Talked about daily activity and she will follow-up with her PCP    Atrial fibrillation  Echo was not affordable for her.  We talked about doing an EKG she does not have time to stay today    Talked about prognosis and anticoagulation.  CBC ordered as above.  We looked at the images of her echocardiogram    Hypertension  Good control on current medical regimen.  Medications ordered, again requested blood work and talked about follow-up    Echo if affordable in a year  RTC in the spring sooner with concerns.  I did order her anticoagulation through our sample clinic.  She will let us know how much it cost next year.  Metoprolol renewed as well

## 2019-01-17 ENCOUNTER — TELEPHONE (OUTPATIENT)
Dept: CARDIOLOGY | Facility: MEDICAL CENTER | Age: 69
End: 2019-01-17

## 2019-01-17 NOTE — TELEPHONE ENCOUNTER
Eli Gordon R.N.             LA/Barbara     Patient has some questions about her medications and wants a call back at [phone].      Called pt, pt reports she fell last Sunday and had dislocated shoulder, she was prescribed Percocet and Ibuprofen PRN and she wants to make sure she is ok to take this with Eliquis.     To Dr Ibarra

## 2019-01-18 NOTE — TELEPHONE ENCOUNTER
Dang Ibarra M.D.   You 14 hours ago (6:08 PM)      Ok to take NSAIDs PRn for ~ 1 week     Hope that helps! (Routing comment)      Called pt and notified, pt verbalizes understanding

## 2020-07-21 PROBLEM — R45.4 IRRITABILITY: Status: ACTIVE | Noted: 2020-07-21

## 2020-07-21 PROBLEM — I48.19 PERSISTENT ATRIAL FIBRILLATION (HCC): Status: ACTIVE | Noted: 2020-07-21

## 2020-07-21 PROBLEM — R45.86 MOOD CHANGES: Status: ACTIVE | Noted: 2020-07-21

## 2020-10-27 PROBLEM — M13.89: Status: ACTIVE | Noted: 2020-10-27

## 2021-04-13 PROBLEM — G89.29 CHRONIC LEFT SHOULDER PAIN: Status: ACTIVE | Noted: 2021-04-13

## 2021-04-13 PROBLEM — K43.9 ABDOMINAL WALL HERNIA: Status: ACTIVE | Noted: 2021-04-13

## 2021-04-13 PROBLEM — M25.512 CHRONIC LEFT SHOULDER PAIN: Status: ACTIVE | Noted: 2021-04-13

## 2021-04-13 PROBLEM — E06.3 AUTOIMMUNE THYROIDITIS: Status: ACTIVE | Noted: 2021-04-13

## 2021-09-30 PROBLEM — S66.911A MUSCLE STRAIN OF RIGHT WRIST: Status: ACTIVE | Noted: 2021-09-30

## 2022-04-21 PROBLEM — M25.50 CHRONIC PAIN OF MULTIPLE JOINTS: Status: ACTIVE | Noted: 2022-04-21

## 2022-04-21 PROBLEM — F32.9 REACTIVE DEPRESSION: Status: ACTIVE | Noted: 2022-04-21

## 2022-04-21 PROBLEM — G89.29 CHRONIC PAIN OF MULTIPLE JOINTS: Status: ACTIVE | Noted: 2022-04-21

## 2022-07-26 PROBLEM — G43.009 MIGRAINE WITHOUT AURA AND WITHOUT STATUS MIGRAINOSUS, NOT INTRACTABLE: Status: ACTIVE | Noted: 2022-07-26

## 2022-12-21 ENCOUNTER — APPOINTMENT (OUTPATIENT)
Dept: URGENT CARE | Facility: PHYSICIAN GROUP | Age: 72
End: 2022-12-21